# Patient Record
Sex: FEMALE | URBAN - METROPOLITAN AREA
[De-identification: names, ages, dates, MRNs, and addresses within clinical notes are randomized per-mention and may not be internally consistent; named-entity substitution may affect disease eponyms.]

---

## 2017-02-02 ENCOUNTER — IMPORTED ENCOUNTER (OUTPATIENT)
Dept: URBAN - METROPOLITAN AREA CLINIC 9 | Facility: CLINIC | Age: 27
End: 2017-02-02

## 2021-03-05 ENCOUNTER — IMPORTED ENCOUNTER (OUTPATIENT)
Dept: URBAN - METROPOLITAN AREA CLINIC 9 | Facility: CLINIC | Age: 31
End: 2021-03-05

## 2021-03-25 ENCOUNTER — IMPORTED ENCOUNTER (OUTPATIENT)
Dept: URBAN - METROPOLITAN AREA CLINIC 9 | Facility: CLINIC | Age: 31
End: 2021-03-25

## 2021-04-07 ENCOUNTER — IMPORTED ENCOUNTER (OUTPATIENT)
Dept: URBAN - METROPOLITAN AREA CLINIC 9 | Facility: CLINIC | Age: 31
End: 2021-04-07

## 2021-05-12 ENCOUNTER — IMPORTED ENCOUNTER (OUTPATIENT)
Dept: URBAN - METROPOLITAN AREA CLINIC 9 | Facility: CLINIC | Age: 31
End: 2021-05-12

## 2021-05-12 PROBLEM — H52.13: Noted: 2021-05-12

## 2021-05-12 PROBLEM — H52.12: Noted: 2021-05-12

## 2021-10-16 ASSESSMENT — KERATOMETRY
OD_K2POWER_DIOPTERS: 44.75
OS_AXISANGLE_DEGREES: 168
OD_AXISANGLE2_DEGREES: 93
OS_AXISANGLE2_DEGREES: 78
OS_K1POWER_DIOPTERS: 43.5
OD_K1POWER_DIOPTERS: 43.25
OD_AXISANGLE_DEGREES: 3
OS_K2POWER_DIOPTERS: 45.25

## 2021-10-16 ASSESSMENT — TONOMETRY
OD_IOP_MMHG: 20
OS_IOP_MMHG: 19

## 2021-10-16 ASSESSMENT — VISUAL ACUITY
OS_CC: 20/20 SN
OD_CC: 20/20 SN

## 2022-02-04 NOTE — PROCEDURE NOTE: CLINICAL
PROCEDURE NOTE: Botox, Cosmetic #20 OU. Diagnosis: Rhytids, Crowsfeet/Glabella/Forehead. Prior to treatment, the risks/benefits/alternatives were discussed. The patient wished to proceed with procedure. Refer to template for location and amounts of injections. Botox was injected at each site without complications. Patient tolerated procedure well. There were no complications. Post procedure instructions given. Cecy Moyer

## 2022-02-04 NOTE — PATIENT DISCUSSION
Discussed r/b of botox injections today.  Patient expressed understanding and wishes to proceed.  Patient tolerated the injections well today.

## 2022-09-14 ENCOUNTER — ESTABLISHED PATIENT (OUTPATIENT)
Dept: URBAN - METROPOLITAN AREA CLINIC 17 | Facility: CLINIC | Age: 32
End: 2022-09-14

## 2022-09-14 DIAGNOSIS — H52.13: ICD-10-CM

## 2022-09-14 DIAGNOSIS — H04.123: ICD-10-CM

## 2022-09-14 PROCEDURE — 99199PERE PHYSICIANS EYECARE PLAN EXAM AND REFRACT EST PT

## 2022-09-14 PROCEDURE — 92310K CONTACT LENS FITTING 68

## 2022-09-14 ASSESSMENT — TONOMETRY
OD_IOP_MMHG: 20
OS_IOP_MMHG: 20

## 2022-09-14 ASSESSMENT — VISUAL ACUITY
OS_CC: 20/25
OD_CC: 20/20

## 2022-11-11 ENCOUNTER — CONTACT LENSES/GLASSES VISIT (OUTPATIENT)
Dept: URBAN - METROPOLITAN AREA CLINIC 17 | Facility: CLINIC | Age: 32
End: 2022-11-11

## 2022-11-11 DIAGNOSIS — H52.13: ICD-10-CM

## 2022-11-11 PROCEDURE — 99211NC NO CHARGE VISIT

## 2023-08-30 ENCOUNTER — CONSULTATION/EVALUATION (OUTPATIENT)
Dept: URBAN - METROPOLITAN AREA CLINIC 14 | Facility: CLINIC | Age: 33
End: 2023-08-30

## 2023-08-30 DIAGNOSIS — H52.7: ICD-10-CM

## 2023-08-30 PROCEDURE — 99499RLE REFRACTIVE CONSULT/RLE

## 2023-08-30 ASSESSMENT — KERATOMETRY
OD_K1POWER_DIOPTERS: 43.00
OS_AXISANGLE2_DEGREES: 79
OD_K2POWER_DIOPTERS: 44.50
OS_K2POWER_DIOPTERS: 45.25
OS_AXISANGLE_DEGREES: 169
OD_AXISANGLE2_DEGREES: 94
OS_K1POWER_DIOPTERS: 43.25
OD_AXISANGLE_DEGREES: 004

## 2023-08-30 ASSESSMENT — VISUAL ACUITY
OD_SC: CF 3FT
OS_CC: 20/20
OD_BCVA: 20/20
OD_CC: 20/20-1
OS_BCVA: 20/25+2
OS_SC: CF 3FT

## 2023-08-30 ASSESSMENT — TONOMETRY
OS_IOP_MMHG: 16
OD_IOP_MMHG: 20

## 2023-09-12 ENCOUNTER — ESTABLISHED PATIENT (OUTPATIENT)
Dept: URBAN - METROPOLITAN AREA CLINIC 18 | Facility: CLINIC | Age: 33
End: 2023-09-12

## 2023-09-12 DIAGNOSIS — D31.31: ICD-10-CM

## 2023-09-12 DIAGNOSIS — H52.13: ICD-10-CM

## 2023-09-12 PROCEDURE — 92201 OPSCPY EXTND RTA DRAW UNI/BI: CPT

## 2023-09-12 PROCEDURE — 99213 OFFICE O/P EST LOW 20 MIN: CPT

## 2023-09-12 ASSESSMENT — KERATOMETRY
OD_AXISANGLE_DEGREES: 004
OD_AXISANGLE2_DEGREES: 94
OD_K1POWER_DIOPTERS: 43.00
OD_K2POWER_DIOPTERS: 44.50
OS_K2POWER_DIOPTERS: 45.25
OS_AXISANGLE2_DEGREES: 79
OS_AXISANGLE_DEGREES: 169
OS_K1POWER_DIOPTERS: 43.25

## 2023-09-12 ASSESSMENT — VISUAL ACUITY
OS_CC: 20/20
OD_CC: 20/20

## 2023-09-12 ASSESSMENT — TONOMETRY
OD_IOP_MMHG: 13
OS_IOP_MMHG: 14

## 2023-09-19 ENCOUNTER — POST-OP (OUTPATIENT)
Dept: URBAN - METROPOLITAN AREA CLINIC 14 | Facility: CLINIC | Age: 33
End: 2023-09-19

## 2023-09-19 DIAGNOSIS — H52.12: ICD-10-CM

## 2023-09-19 PROCEDURE — 99024 POSTOP FOLLOW-UP VISIT: CPT

## 2023-09-19 ASSESSMENT — VISUAL ACUITY
OD_SC: 20/25
OS_BCVA: 20/20
OS_CC: 20/20

## 2023-09-19 ASSESSMENT — KERATOMETRY
OS_AXISANGLE_DEGREES: 169
OD_K1POWER_DIOPTERS: 43.00
OS_K1POWER_DIOPTERS: 43.25
OD_K2POWER_DIOPTERS: 44.50
OS_K2POWER_DIOPTERS: 45.25
OD_AXISANGLE2_DEGREES: 94
OD_AXISANGLE_DEGREES: 004
OS_AXISANGLE2_DEGREES: 79

## 2023-09-19 ASSESSMENT — TONOMETRY
OS_IOP_MMHG: 16
OD_IOP_MMHG: 16

## 2023-09-20 ENCOUNTER — POST-OP (OUTPATIENT)
Dept: URBAN - METROPOLITAN AREA CLINIC 14 | Facility: CLINIC | Age: 33
End: 2023-09-20

## 2023-09-20 DIAGNOSIS — Z98.890: ICD-10-CM

## 2023-09-20 PROCEDURE — 99024 POSTOP FOLLOW-UP VISIT: CPT

## 2023-09-20 ASSESSMENT — KERATOMETRY
OS_AXISANGLE_DEGREES: 169
OS_K2POWER_DIOPTERS: 45.25
OD_AXISANGLE2_DEGREES: 94
OS_AXISANGLE2_DEGREES: 79
OD_K2POWER_DIOPTERS: 44.50
OS_K1POWER_DIOPTERS: 43.25
OD_AXISANGLE_DEGREES: 004
OD_K1POWER_DIOPTERS: 43.00

## 2023-09-20 ASSESSMENT — TONOMETRY
OD_IOP_MMHG: 18
OS_IOP_MMHG: 21

## 2023-09-20 ASSESSMENT — VISUAL ACUITY
OD_SC: 20/20
OS_SC: 20/20

## 2023-09-21 ENCOUNTER — POST-OP (OUTPATIENT)
Dept: URBAN - METROPOLITAN AREA CLINIC 14 | Facility: CLINIC | Age: 33
End: 2023-09-21

## 2023-09-21 DIAGNOSIS — Z98.890: ICD-10-CM

## 2023-09-21 PROCEDURE — 99024 POSTOP FOLLOW-UP VISIT: CPT

## 2023-09-21 ASSESSMENT — KERATOMETRY
OD_AXISANGLE_DEGREES: 004
OD_K1POWER_DIOPTERS: 43.00
OS_K1POWER_DIOPTERS: 43.25
OS_AXISANGLE_DEGREES: 169
OS_AXISANGLE2_DEGREES: 79
OD_K2POWER_DIOPTERS: 44.50
OS_K2POWER_DIOPTERS: 45.25
OD_AXISANGLE2_DEGREES: 94

## 2023-09-21 ASSESSMENT — TONOMETRY
OD_IOP_MMHG: 12
OS_IOP_MMHG: 13

## 2023-09-21 ASSESSMENT — VISUAL ACUITY
OD_SC: 20/20
OU_SC: 20/20
OS_SC: 20/20

## 2023-10-23 ENCOUNTER — POST-OP (OUTPATIENT)
Dept: URBAN - METROPOLITAN AREA CLINIC 14 | Facility: CLINIC | Age: 33
End: 2023-10-23

## 2023-10-23 DIAGNOSIS — Z98.890: ICD-10-CM

## 2023-10-23 PROCEDURE — 99024 POSTOP FOLLOW-UP VISIT: CPT

## 2023-10-23 ASSESSMENT — VISUAL ACUITY
OS_SC: 20/20
OD_SC: 20/20

## 2023-10-23 ASSESSMENT — KERATOMETRY
OS_K2POWER_DIOPTERS: 45.25
OS_AXISANGLE_DEGREES: 169
OD_K2POWER_DIOPTERS: 44.50
OD_AXISANGLE2_DEGREES: 94
OD_K1POWER_DIOPTERS: 43.00
OS_AXISANGLE2_DEGREES: 79
OD_AXISANGLE_DEGREES: 004
OS_K1POWER_DIOPTERS: 43.25

## 2023-10-23 ASSESSMENT — TONOMETRY
OS_IOP_MMHG: 19
OD_IOP_MMHG: 20